# Patient Record
Sex: FEMALE | Race: WHITE | NOT HISPANIC OR LATINO | Employment: UNEMPLOYED | URBAN - METROPOLITAN AREA
[De-identification: names, ages, dates, MRNs, and addresses within clinical notes are randomized per-mention and may not be internally consistent; named-entity substitution may affect disease eponyms.]

---

## 2023-12-05 ENCOUNTER — TELEPHONE (OUTPATIENT)
Dept: GASTROENTEROLOGY | Facility: CLINIC | Age: 28
End: 2023-12-05

## 2023-12-05 ENCOUNTER — CONSULT (OUTPATIENT)
Dept: GASTROENTEROLOGY | Facility: CLINIC | Age: 28
End: 2023-12-05
Payer: COMMERCIAL

## 2023-12-05 VITALS
DIASTOLIC BLOOD PRESSURE: 64 MMHG | SYSTOLIC BLOOD PRESSURE: 98 MMHG | BODY MASS INDEX: 21.44 KG/M2 | WEIGHT: 133.4 LBS | HEIGHT: 66 IN

## 2023-12-05 DIAGNOSIS — K21.9 GASTROESOPHAGEAL REFLUX DISEASE, UNSPECIFIED WHETHER ESOPHAGITIS PRESENT: Primary | ICD-10-CM

## 2023-12-05 DIAGNOSIS — R19.4 CHANGE IN BOWEL HABITS: ICD-10-CM

## 2023-12-05 PROCEDURE — 99204 OFFICE O/P NEW MOD 45 MIN: CPT | Performed by: INTERNAL MEDICINE

## 2023-12-05 RX ORDER — DICYCLOMINE HCL 20 MG
TABLET ORAL 3 TIMES DAILY
COMMUNITY
Start: 2023-11-21 | End: 2023-12-05

## 2023-12-05 RX ORDER — LEVONORGESTREL AND ETHINYL ESTRADIOL 0.15-0.03
1 KIT ORAL DAILY
COMMUNITY
Start: 2023-11-07

## 2023-12-05 RX ORDER — POLYETHYLENE GLYCOL 3350 17 G/17G
17 POWDER, FOR SOLUTION ORAL DAILY
Qty: 255 G | Refills: 1 | Status: SHIPPED | OUTPATIENT
Start: 2023-12-05

## 2023-12-05 RX ORDER — FAMOTIDINE 40 MG/1
40 TABLET, FILM COATED ORAL DAILY
Qty: 90 TABLET | Refills: 0 | Status: SHIPPED | OUTPATIENT
Start: 2023-12-05

## 2023-12-05 NOTE — TELEPHONE ENCOUNTER
Scheduled date of colonoscopy/EGD(as of today):01/22/2024  Physician performing colonoscopy/EGD: Dr Zaira Lee  Location of colonoscopy/EGD: [facility] ([facility])  Bowel prep reviewed with patient: clenpiq  Instructions given to patient by: fariha  Clearances: none  : Tawanda Ferreira [phone]

## 2023-12-05 NOTE — PROGRESS NOTES
[facility] - Outpatient Consultation  Eliza Zimmer 33 y.o. female MRN: [id number]  Encounter: [id number]    ASSESSMENT AND PLAN:      1. Gastroesophageal reflux disease, unspecified whether esophagitis present  I do believe there is an element of reflux playing a role, as her symptoms worsen including her nausea when she eats fried fatty foods. I have given her famotidine as needed and we will proceed with upper endoscopy to rule out erosive disease, Matamoros's esophagus, peptic ulcer disease, and H. pylori. She has already had negative celiac serologies  - EGD; Future  - famotidine (PEPCID) 40 MG tablet; Take 1 tablet (40 mg total) by mouth daily  Dispense: 90 tablet; Refill: 0    2. Change in bowel habits  This change in bowel habits accompanied by her abdominal pain and macrocytosis with potential  vitamin B12 deficiency could be a constellation of symptoms chalked up to Crohn's disease. I will proceed with colonoscopy as well to rule out inflammatory bowel disease and any intraluminal etiologies explaining her change in bowel habits. Procedure risks and preparation discussed in detail. I will also check a TSH to ensure that her constipation is not from new onset hypothyroidism.  - Colonoscopy; Future  - sodium picosulfate, magnesium oxide, citric acid (Clenpiq) oral solution; Take 175 mL (1 bottle) the evening before the colonoscopy, between 5 PM and 9 PM, followed by a second 175 mL bottle 5 hours before the colonoscopy. Dispense: 350 mL; Refill: 0  - polyethylene glycol (GLYCOLAX) 17 GM/SCOOP powder; Take 17 g by mouth daily  Dispense: 255 g; Refill: 1  - TSH w/Reflex; Future  - Vitamin B12; Future  - TSH w/Reflex  - Vitamin B12      Follow up Appointment:  For upper endoscopy and colonoscopy    _______________________      Chief Complaint   Patient presents with    Abdominal Pain     epigastric    Diarrhea    Nausea     Get shakey    Bloated       HPI:   Jeremiah Goldberg Shyanne Sierra is a 33y.o. year old female with a PMH significant for 3 pregnancies who presents from a consultation from PCP for recent GI issues including nausea, abdominal pain, and alternating constipation with diarrhea. She states that for about a couple years she has been experiencing severe nausea and epigastric abdominal pain that is usually associated with the following bowel movement that is either loose and small or normal bowel movement at all. She does not see blood or mucus in the stool. She does find that her symptoms are typically flared by fried fatty foods. For example she was at the [employer] and had buffalo shrimp and had excruciating epigastric pain and small bowel movements. The bowel movements did not alleviate the pain, and it subsided hours later. She does admit to rare heartburn but no dysphagia or odynophagia. She is not aware of any family history of inflammatory bowel disease or colon cancer. She had a celiac serologies checked by her PCP that were negative. She also had a CAT scan that was reportedly normal she does admit to not feeling fully evacuated when she has a bowel movement    Historical Information   History reviewed. No pertinent past medical history.   Past Surgical History:   Procedure Laterality Date    WISDOM TOOTH EXTRACTION       Social History     Substance and Sexual Activity   Alcohol Use Yes    Alcohol/week: 3.0 standard drinks of alcohol    Types: 1 Glasses of wine, 2 Cans of beer per week     Social History     Substance and Sexual Activity   Drug Use Never     Social History     Tobacco Use   Smoking Status Never   Smokeless Tobacco Never     Family History   Problem Relation Age of Onset    Colon cancer Neg Hx     Colon polyps Neg Hx     Esophageal cancer Neg Hx     Stomach cancer Neg Hx        Meds/Allergies     Current Outpatient Medications:     famotidine (PEPCID) 40 MG tablet    levonorgestrel-ethinyl estradiol (SEASONALE) 0.15-0.03 MG per tablet    polyethylene glycol (GLYCOLAX) 17 GM/SCOOP powder    sodium picosulfate, magnesium oxide, citric acid (Clenpiq) oral solution    dicyclomine (BENTYL) 20 mg tablet    No Known Allergies    PHYSICAL EXAM:    Blood pressure 98/64, height 5' 6" (1.676 m), weight 60.5 kg (133 lb 6.4 oz). Body mass index is 21.53 kg/m². General Appearance: NAD, cooperative, alert  Eyes: Anicteric  GI:  Soft, non-tender, non-distended; normal bowel sounds; no masses, no organomegaly   Rectal: Deferred  Musculoskeletal: No edema. Skin:  No jaundice    Lab Results:   No results found for: "WBC", "HGB", "MCV", "PLT", "INR"  No results found for: "NA", "K", "CL", "CO2", "ANIONGAP", "BUN", "CREATININE", "GLUCOSE", "GLUF", "CALCIUM", "CORRECTEDCA", "AST", "ALT", "ALKPHOS", "PROT", "BILITOT", "EGFR"  No results found for: "IRON", "TIBC", "FERRITIN"  No results found for: "LIPASE"    Radiology Results:   No results found.

## 2023-12-11 ENCOUNTER — NURSE TRIAGE (OUTPATIENT)
Age: 28
End: 2023-12-11

## 2023-12-11 NOTE — TELEPHONE ENCOUNTER
Pt called to explain that she scheduled her US but then got a call back that her insurance requires her to go to [facility], however, there earliest availability is 1/3/23. She mentioned going to ED. She was looking for advice on what to do. I advised pt to either keep 1/3 appt with [facility] and call every couple days for cancellations, go to ED if pain is severe, or have US done at original location and pay out of pocket. Pt was provided with number for .

## 2023-12-11 NOTE — TELEPHONE ENCOUNTER
Last ov 12/5/23 Dr. Sharon Fajardo, scheduled for combo 1/22/24, Labs 11/2023,Imaging CT abd/pelvis MEDICAL [facility] 10/5/23. Patient sent a message also, complains of worsening pain under breastbone above umbilicus and radiating to her back right side. She does note abdominal pain worse after eating. [facility] and order will need to be faxed to patient registration for patient to complete. The fax #[phone] or she can print out from her [employer] if she accesses it. Please review and advise. Reason for Disposition   Information only question and nurse able to answer    Answer Assessment - Initial Assessment Questions  1. REASON FOR CALL or QUESTION: "What is your reason for calling today?" or "How can I best help you?" or "What question do you have that I can help answer?"      Patient requesting an order for abdominal ultrasound. Protocols used:  Information Only Call - No Triage-ADULT-[place]

## 2023-12-11 NOTE — TELEPHONE ENCOUNTER
Patient called and advised her that order was in however order is not signed/released. Please have doctor sign and return her call when she can see it in [facility].  [phone]

## 2023-12-11 NOTE — TELEPHONE ENCOUNTER
Regarding: worsening pain  ----- Message from Keiry Neil sent at 12/11/2023 10:34 AM EST -----  Pt was seen on 12/5/23. She is calling to say her pain is getting worse and was wondering if the Dr. Yamel Salguero order an ultrasound.  Please reach out to the pt

## 2023-12-12 ENCOUNTER — NURSE TRIAGE (OUTPATIENT)
Age: 28
End: 2023-12-12

## 2023-12-12 LAB
TSH SERPL DL<=0.005 MIU/L-ACNC: 2.25 UIU/ML (ref 0.45–4.5)
VIT B12 SERPL-MCNC: 360 PG/ML (ref 232–1245)

## 2023-12-12 NOTE — TELEPHONE ENCOUNTER
Patient called in stating she reported to [facility] ER yesterday, they performed ultrasound, no abnormal findings. (Will have staff send record release). Patient was ordered pantoprazole and sucralfate at discharge and she is requesting your input if she should take the medication for ongoing RUQ pain. Please advise. **Patient is also requesting that her procedures be scheduled earlier than 1/22/24. Please contact to update if any availability. Answer Assessment - Initial Assessment Questions  1. REASON FOR CALL or QUESTION: "What is your reason for calling today?" or "How can I best help you?" or "What question do you have that I can help answer?"      Patient wanted to inform provider of ER visit. Protocols used:  Information Only Call - No Triage-ADULT-[place]

## 2024-01-08 ENCOUNTER — ANESTHESIA (OUTPATIENT)
Dept: ANESTHESIOLOGY | Facility: AMBULATORY SURGERY CENTER | Age: 29
End: 2024-01-08

## 2024-01-08 ENCOUNTER — ANESTHESIA EVENT (OUTPATIENT)
Dept: ANESTHESIOLOGY | Facility: AMBULATORY SURGERY CENTER | Age: 29
End: 2024-01-08

## 2024-01-18 ENCOUNTER — TELEPHONE (OUTPATIENT)
Dept: GASTROENTEROLOGY | Facility: AMBULATORY SURGERY CENTER | Age: 29
End: 2024-01-18

## 2024-01-18 NOTE — PROGRESS NOTES
LM to call back to review instructions and confirm 1:30 PM arrival time for 1/22 EGD and Colonoscopy

## 2024-01-22 ENCOUNTER — HOSPITAL ENCOUNTER (OUTPATIENT)
Dept: GASTROENTEROLOGY | Facility: AMBULATORY SURGERY CENTER | Age: 29
Discharge: HOME | End: 2024-01-22
Attending: INTERNAL MEDICINE
Payer: COMMERCIAL

## 2024-01-22 ENCOUNTER — ANESTHESIA (OUTPATIENT)
Dept: GASTROENTEROLOGY | Facility: AMBULATORY SURGERY CENTER | Age: 29
End: 2024-01-22

## 2024-01-22 ENCOUNTER — ANESTHESIA EVENT (OUTPATIENT)
Dept: GASTROENTEROLOGY | Facility: AMBULATORY SURGERY CENTER | Age: 29
End: 2024-01-22

## 2024-01-22 VITALS
OXYGEN SATURATION: 100 % | RESPIRATION RATE: 22 BRPM | TEMPERATURE: 99.1 F | HEART RATE: 83 BPM | SYSTOLIC BLOOD PRESSURE: 112 MMHG | WEIGHT: 128 LBS | HEIGHT: 66 IN | BODY MASS INDEX: 20.57 KG/M2 | DIASTOLIC BLOOD PRESSURE: 64 MMHG

## 2024-01-22 DIAGNOSIS — R19.4 CHANGE IN BOWEL HABITS: ICD-10-CM

## 2024-01-22 DIAGNOSIS — K21.9 GASTROESOPHAGEAL REFLUX DISEASE, UNSPECIFIED WHETHER ESOPHAGITIS PRESENT: ICD-10-CM

## 2024-01-22 LAB
EXT PREGNANCY TEST URINE: NEGATIVE
EXT. CONTROL: NORMAL

## 2024-01-22 PROCEDURE — 88305 TISSUE EXAM BY PATHOLOGIST: CPT | Performed by: STUDENT IN AN ORGANIZED HEALTH CARE EDUCATION/TRAINING PROGRAM

## 2024-01-22 PROCEDURE — 45378 DIAGNOSTIC COLONOSCOPY: CPT | Performed by: INTERNAL MEDICINE

## 2024-01-22 PROCEDURE — 43239 EGD BIOPSY SINGLE/MULTIPLE: CPT | Performed by: INTERNAL MEDICINE

## 2024-01-22 RX ORDER — PROPOFOL 10 MG/ML
INJECTION, EMULSION INTRAVENOUS CONTINUOUS PRN
Status: DISCONTINUED | OUTPATIENT
Start: 2024-01-22 | End: 2024-01-22

## 2024-01-22 RX ORDER — PROPOFOL 10 MG/ML
INJECTION, EMULSION INTRAVENOUS AS NEEDED
Status: DISCONTINUED | OUTPATIENT
Start: 2024-01-22 | End: 2024-01-22

## 2024-01-22 RX ORDER — LIDOCAINE HYDROCHLORIDE 10 MG/ML
INJECTION, SOLUTION EPIDURAL; INFILTRATION; INTRACAUDAL; PERINEURAL AS NEEDED
Status: DISCONTINUED | OUTPATIENT
Start: 2024-01-22 | End: 2024-01-22

## 2024-01-22 RX ORDER — SODIUM CHLORIDE, SODIUM LACTATE, POTASSIUM CHLORIDE, CALCIUM CHLORIDE 600; 310; 30; 20 MG/100ML; MG/100ML; MG/100ML; MG/100ML
50 INJECTION, SOLUTION INTRAVENOUS CONTINUOUS
Status: DISCONTINUED | OUTPATIENT
Start: 2024-01-22 | End: 2024-01-22

## 2024-01-22 RX ADMIN — PROPOFOL 50 MG: 10 INJECTION, EMULSION INTRAVENOUS at 14:02

## 2024-01-22 RX ADMIN — PROPOFOL 50 MG: 10 INJECTION, EMULSION INTRAVENOUS at 14:06

## 2024-01-22 RX ADMIN — PROPOFOL 140 MCG/KG/MIN: 10 INJECTION, EMULSION INTRAVENOUS at 13:57

## 2024-01-22 RX ADMIN — PROPOFOL 50 MG: 10 INJECTION, EMULSION INTRAVENOUS at 14:04

## 2024-01-22 RX ADMIN — PROPOFOL 50 MG: 10 INJECTION, EMULSION INTRAVENOUS at 14:09

## 2024-01-22 RX ADMIN — PROPOFOL 50 MG: 10 INJECTION, EMULSION INTRAVENOUS at 14:12

## 2024-01-22 RX ADMIN — PROPOFOL 50 MG: 10 INJECTION, EMULSION INTRAVENOUS at 13:58

## 2024-01-22 RX ADMIN — PROPOFOL 50 MG: 10 INJECTION, EMULSION INTRAVENOUS at 14:00

## 2024-01-22 RX ADMIN — SODIUM CHLORIDE, SODIUM LACTATE, POTASSIUM CHLORIDE, CALCIUM CHLORIDE 50 ML/HR: 600; 310; 30; 20 INJECTION, SOLUTION INTRAVENOUS at 13:42

## 2024-01-22 RX ADMIN — LIDOCAINE HYDROCHLORIDE 100 MG: 10 INJECTION, SOLUTION EPIDURAL; INFILTRATION; INTRACAUDAL; PERINEURAL at 13:57

## 2024-01-22 RX ADMIN — PROPOFOL 100 MG: 10 INJECTION, EMULSION INTRAVENOUS at 13:57

## 2024-01-22 NOTE — ANESTHESIA POSTPROCEDURE EVALUATION
Post-Op Assessment Note    CV Status:  Stable  Pain Score: 0    Pain management: adequate       Mental Status:  Awake and sleepy   Hydration Status:  Euvolemic   PONV Controlled:  Controlled   Airway Patency:  Patent     Post Op Vitals Reviewed: Yes      Staff: CRNA               BP   90/54   Temp      Pulse   87   Resp   16   SpO2   98%

## 2024-01-22 NOTE — ANESTHESIA PREPROCEDURE EVALUATION
Procedure:  COLONOSCOPY  EGD    Relevant Problems   No relevant active problems        Physical Exam    Airway    Mallampati score: II         Dental       Cardiovascular      Pulmonary      Other Findings  post-pubertal.      Anesthesia Plan  ASA Score- 1     Anesthesia Type- IV sedation with anesthesia with ASA Monitors.         Additional Monitors:     Airway Plan:            Plan Factors-    Chart reviewed.    Patient summary reviewed.    Patient is not a current smoker. Patient not instructed to abstain from smoking on day of procedure. Patient did not smoke on day of surgery.            Induction- intravenous.    Postoperative Plan-     Informed Consent- Anesthetic plan and risks discussed with patient.  I personally reviewed this patient with the CRNA. Discussed and agreed on the Anesthesia Plan with the CRNA..

## 2024-01-22 NOTE — H&P
"History and Physical -  [facility]  Eliseo Noriega 28 y.o. female MRN: [id number]    HPI: Eliseo Noriega is a 28 y.o. female who presents for upper endoscopy and colonoscopy due to epigastric pain and change in bowel    REVIEW OF SYSTEMS: Per the HPI, and otherwise unremarkable.    Historical Information   Past Medical History:   Diagnosis Date    Patient denies medical problems      Past Surgical History:   Procedure Laterality Date    WISDOM TOOTH EXTRACTION       Social History   Social History     Substance and Sexual Activity   Alcohol Use Yes    Alcohol/week: 3.0 standard drinks of alcohol    Types: 1 Glasses of wine, 2 Cans of beer per week    Comment: social     Social History     Substance and Sexual Activity   Drug Use Never     Social History     Tobacco Use   Smoking Status Never   Smokeless Tobacco Never     Family History   Problem Relation Age of Onset    Colon cancer Neg Hx     Colon polyps Neg Hx     Esophageal cancer Neg Hx     Stomach cancer Neg Hx        Meds/Allergies       Current Outpatient Medications:     levonorgestrel-ethinyl estradiol (SEASONALE) 0.15-0.03 MG per tablet    dicyclomine (BENTYL) 20 mg tablet    famotidine (PEPCID) 40 MG tablet    polyethylene glycol (GLYCOLAX) 17 GM/SCOOP powder    sodium picosulfate, magnesium oxide, citric acid (Clenpiq) oral solution    Current Facility-Administered Medications:     lactated ringers infusion, 50 mL/hr, Intravenous, Continuous, Continue from Pre-op at 01/22/24 1348    No Known Allergies    Objective     /70   Pulse (!) 113   Temp 99.1 °F (37.3 °C) (Temporal)   Resp 17   Ht 5' 6\" (1.676 m)   Wt 58.1 kg (128 lb)   LMP 01/11/2024 (Exact Date)   SpO2 100%   BMI 20.66 kg/m²     PHYSICAL EXAM    Gen: NAD AAOx3  Head: Normocephalic, Atraumatic  CV: S1S2 RRR no m/r/g  CHEST: Clear b/l no c/r/w  ABD: soft, +BS NT/ND  EXT: no edema    ASSESSMENT/PLAN:  This is a 28 y.o. year old female here for upper endoscopy and " colonoscopy, and she is stable and optimized for her procedure.

## 2024-01-24 PROCEDURE — 88305 TISSUE EXAM BY PATHOLOGIST: CPT | Performed by: STUDENT IN AN ORGANIZED HEALTH CARE EDUCATION/TRAINING PROGRAM

## 2024-12-09 ENCOUNTER — OFFICE VISIT (OUTPATIENT)
Dept: OBGYN CLINIC | Facility: CLINIC | Age: 29
End: 2024-12-09
Payer: COMMERCIAL

## 2024-12-09 VITALS
BODY MASS INDEX: 21.21 KG/M2 | DIASTOLIC BLOOD PRESSURE: 70 MMHG | WEIGHT: 132 LBS | SYSTOLIC BLOOD PRESSURE: 100 MMHG | HEIGHT: 66 IN

## 2024-12-09 DIAGNOSIS — Z01.419 WOMEN'S ANNUAL ROUTINE GYNECOLOGICAL EXAMINATION: Primary | ICD-10-CM

## 2024-12-09 PROCEDURE — S0610 ANNUAL GYNECOLOGICAL EXAMINA: HCPCS | Performed by: OBSTETRICS & GYNECOLOGY

## 2024-12-09 NOTE — PROGRESS NOTES
[facility] - [address], [address], [place] [address]    ASSESSMENT/PLAN: Eliseo Noriega is a 29 y.o.  who presents for annual gynecologic exam.    Encounter for routine gynecologic examination  - Routine well woman exam completed today.  - Cervical Cancer Screening: Current ASCCP Guidelines reviewed. Last Pap: Not on file . History of abnormal: 2 years ago  - HPV Vaccination status: Immunization series complete  - STI screening offered including HIV testing: offered, pt declined  - Contraceptive counseling discussed.  Current contraception: no method, pt tried OCPs but caused abdominal pain:   - The following were reviewed in today's visit: breast self exam, adequate intake of calcium and vitamin D, exercise, and healthy diet    Additional problems addressed during this visit:  1. Women's annual routine gynecological examination  -     IGP, Aptima HPV, Rfx 16/18,45      CC:  Annual Gynecologic Examination    HPI: Eliseo Noriega is a 29 y.o.  who presents for annual gynecologic examination. She reports a monthly menses. She reports having pelvic pain during intercourse only when she is ovulating. So if she avoids intercourse during that time, her pelvic pain is manageable. She had been started on OCPs to help with this however caused her abdominal pain. She is undecided on future pregnancy.     ROS: Negative except as noted in HPI    Patient's last menstrual period was 2024.       She  reports being sexually active and has had partner(s) who are male. She reports using the following method of birth control/protection: Condom Male.       The following portions of the patient's history were reviewed and updated as appropriate:   Past Medical History:   Diagnosis Date    Endometriosis     Patient denies medical problems      Past Surgical History:   Procedure Laterality Date    WISDOM TOOTH EXTRACTION       Family History   Problem Relation Age of Onset    Colon cancer Neg Hx   "   Colon polyps Neg Hx     Esophageal cancer Neg Hx     Stomach cancer Neg Hx      Social History     Socioeconomic History    Marital status: /Civil Union     Spouse name: None    Number of children: None    Years of education: None    Highest education level: None   Occupational History    None   Tobacco Use    Smoking status: Never    Smokeless tobacco: Never   Vaping Use    Vaping status: Never Used   Substance and Sexual Activity    Alcohol use: Yes     Alcohol/week: 3.0 standard drinks of alcohol     Types: 1 Glasses of wine, 2 Cans of beer per week     Comment: social    Drug use: Never    Sexual activity: Yes     Partners: Male     Birth control/protection: Condom Male     Comment: Birth congrol, just started it   Other Topics Concern    None   Social History Narrative    None     Social Drivers of Health     Financial Resource Strain: Not on file   Food Insecurity: Not on file   Transportation Needs: Not on file   Physical Activity: Not on file   Stress: Not on file   Social Connections: Not on file   Intimate Partner Violence: Not on file   Housing Stability: Not on file     No outpatient medications have been marked as taking for the 12/9/24 encounter (Office Visit) with Teresa CHENG DO.     No Known Allergies        Objective:  /70 (BP Location: Left arm, Patient Position: Sitting, Cuff Size: Standard)   Ht 5' 6\" (1.676 m)   Wt 59.9 kg (132 lb)   LMP 11/21/2024   BMI 21.31 kg/m²        Chaperone present? Pt declined    General Appearance: alert and oriented, in no acute distress.   Respiratory: Unlabored breathing.  Abdomen: Soft, non-tender, non-distended, no masses, no rebound or guarding.  Breast Exam: No dimpling, nipple retraction or discharge. No lumps or masses. No axillary or supraclavicular nodes.   Pelvic:   External genitalia: Normal appearance, no abnormal pigmentation, no lesions or masses. Normal Bartholin's and Hesperia's.      Urinary system: Urethral meatus normal,     "   Bladder non-tender.      Vaginal: normal mucosa without prolapse or lesions. Normal-appearing physiologic discharge.      Cervix: Normal-appearing, well-epithelialized, no gross lesions or masses. No cervical motion tenderness.      Adnexa: No adnexal masses or tenderness noted.      Uterus: Normal-sized, regular contour, midline, mobile, no uterine tenderness.  Extremities: Normal range of motion. Warm, well-perfused, non-tender.   Skin: normal, no rash or abnormalities  Neurologic: alert, oriented x3  Psychiatric: Appropriate affect, mood stable, cooperative with exam.        Teresa Wilkinson DO  12/9/2024 4:55 PM

## 2024-12-13 LAB
CYTOLOGIST CVX/VAG CYTO: NORMAL
DX ICD CODE: NORMAL
HPV GENOTYPE REFLEX: NORMAL
HPV I/H RISK 4 DNA CVX QL PROBE+SIG AMP: NEGATIVE
OTHER STN SPEC: NORMAL
PATH REPORT.FINAL DX SPEC: NORMAL
SL AMB NOTE:: NORMAL
SL AMB SPECIMEN ADEQUACY: NORMAL
SL AMB TEST METHODOLOGY: NORMAL

## 2024-12-16 ENCOUNTER — RESULTS FOLLOW-UP (OUTPATIENT)
Dept: OBGYN CLINIC | Facility: CLINIC | Age: 29
End: 2024-12-16

## 2025-01-31 ENCOUNTER — NURSE TRIAGE (OUTPATIENT)
Age: 30
End: 2025-01-31

## 2025-01-31 ENCOUNTER — OFFICE VISIT (OUTPATIENT)
Dept: OBGYN CLINIC | Facility: CLINIC | Age: 30
End: 2025-01-31
Payer: COMMERCIAL

## 2025-01-31 VITALS
DIASTOLIC BLOOD PRESSURE: 80 MMHG | HEIGHT: 66 IN | SYSTOLIC BLOOD PRESSURE: 130 MMHG | WEIGHT: 130 LBS | BODY MASS INDEX: 20.89 KG/M2

## 2025-01-31 DIAGNOSIS — O36.80X0 PREGNANCY OF UNKNOWN ANATOMIC LOCATION: Primary | ICD-10-CM

## 2025-01-31 PROCEDURE — 99214 OFFICE O/P EST MOD 30 MIN: CPT | Performed by: OBSTETRICS & GYNECOLOGY

## 2025-01-31 NOTE — PROGRESS NOTES
[facility] [address], [address], [place] [address]    Assessment/Plan:  Assessment & Plan  Pregnancy of unknown anatomic location  I recommend she be seen in ER for hcg and formal ultrasound to rule out ectopic. I discussed concern as even with 5 weeks I would be able to see small gestational sac. Recommended to go to [facility] ER. Clinical POD notified to notify ER.         Subjective:   Eliseo Noriega is a 29 y.o.  .  CC:   Chief Complaint   Patient presents with    Pregnancy Ultrasound     5 weeks 3 days, bad abdominal cramping       HPI: 29y  @ 5.3wks by LMP presents with complaints of pelvic cramping that woke her up from her sleep. She reports the pain was much worse than she has experienced in the past. She denies any bleeding. She reports the pain was continuous but now it is coming and going but still intense. Pelvic u/s performed in the office which showed empty uterus.     ROS: Negative except as noted in HPI    Patient's last menstrual period was 2024.       She  reports being sexually active and has had partner(s) who are male. She reports using the following method of birth control/protection: Condom Male.       The following portions of the patient's history were reviewed and updated as appropriate:   Past Medical History:   Diagnosis Date    Endometriosis     Patient denies medical problems      Past Surgical History:   Procedure Laterality Date    WISDOM TOOTH EXTRACTION       Family History   Problem Relation Age of Onset    Colon cancer Neg Hx     Colon polyps Neg Hx     Esophageal cancer Neg Hx     Stomach cancer Neg Hx      Social History     Socioeconomic History    Marital status: /Civil Union     Spouse name: Not on file    Number of children: Not on file    Years of education: Not on file    Highest education level: Not on file   Occupational History    Not on file   Tobacco Use    Smoking status: Never    Smokeless tobacco: Never   Vaping Use     "Vaping status: Never Used   Substance and Sexual Activity    Alcohol use: Yes     Alcohol/week: 3.0 standard drinks of alcohol     Types: 1 Glasses of wine, 2 Cans of beer per week     Comment: social    Drug use: Never    Sexual activity: Yes     Partners: Male     Birth control/protection: Condom Male     Comment: Birth congrol, just started it   Other Topics Concern    Not on file   Social History Narrative    Not on file     Social Drivers of Health     Financial Resource Strain: Not on file   Food Insecurity: Not on file   Transportation Needs: Not on file   Physical Activity: Not on file   Stress: Not on file   Social Connections: Not on file   Intimate Partner Violence: Not on file   Housing Stability: Not on file     No outpatient medications have been marked as taking for the 1/31/25 encounter (Office Visit) with Teresa CHENG DO.     No Known Allergies        Objective:  /80 (BP Location: Right arm, Patient Position: Sitting, Cuff Size: Standard)   Ht 5' 6\" (1.676 m)   Wt 59 kg (130 lb)   LMP 12/24/2024   BMI 20.98 kg/m²          General Appearance: alert and oriented, in no acute distress.   Abdomen: Soft, non-tender, non-distended, no masses, no rebound or guarding.  Extremities: Normal range of motion.   Skin: normal, no rash or abnormalities  Neurologic: alert, oriented x3  Psychiatric: Appropriate affect, mood stable, cooperative with exam.        Teresa Wilkinson DO  1/31/2025 11:29 AM   "

## 2025-01-31 NOTE — TELEPHONE ENCOUNTER
"This patient is 5 weeks 3 days according to LMP of 12/24/24. She is c/o lower abd cramping for 4 days that started as intermittent but has been constant for that last 5 hours and lower back pain. Rates pain a 4-5. She also states she has had an increase in clear / white vaginal discharge this week. Patient denies diarrhea, fever, urination pain, vaginal bleeding, vomiting or any other symptoms at this time. Advised she monitor for increased abdominal pain and back pain and if ot becomes worse or more severe to call back, also call if vaginal bleeding occurs, lightheaded , dizzy, or any other new symptoms. ESC message to Dr. Dueñas.   Response from Dr. Dueñas: Patient can be seen in office today for ultrasound and abdominal exam to assess if ED evaluation is needed.     Called patient and provided advice per Dr. Dueñas. Appointment scheduled for today at 11:30. Patient verbalized understanding.           Reason for Disposition   MODERATE-SEVERE abdominal pain    Answer Assessment - Initial Assessment Questions  1. LOCATION: \"Where does it hurt?\"       Lower abdomen - top of pelvis   2. RADIATION: \"Does the pain shoot anywhere else?\" (e.g., chest, back, shoulder)      No   3. ONSET: \"When did the pain begin?\" (e.g., minutes, hours or days ago)       4 days ago   4. ONSET: \"Gradual or sudden onset?\"      Gradual   5. PATTERN \"Does the pain come and go, or has it been constant since it started?\"       Constant since 3 am   6. SEVERITY: \"How bad is the pain?\" \"What does it keep you from doing?\"  (e.g., Scale 1-10; mild, moderate, or severe)      4-5  7. RECURRENT SYMPTOM: \"Have you ever had this type of stomach pain before?\" If Yes, ask: \"When was the last time?\" and \"What happened that time?\"       No   8. CAUSE: \"What do you think is causing the stomach pain?\"      Unknown   9. RELIEVING/AGGRAVATING FACTORS: \"What makes it better or worse?\" (e.g., antacids, bowel movement, movement)      NO   10. OTHER SYMPTOMS: \"Do you " "have any other symptoms?\" (e.g., back pain, diarrhea, fever, urination pain, vaginal bleeding, vaginal discharge, vomiting)        Lower Back pain - 4-5 (constant).  Increased clear white discharge.    11. CASEY: \"What date are you expecting to deliver?\"        LMP: 12/24/24    Protocols used: Pregnancy - Abdominal Pain Less Than 20 Weeks EGA-Adult-[place]    "

## 2025-02-28 ENCOUNTER — ULTRASOUND (OUTPATIENT)
Dept: OBGYN CLINIC | Facility: CLINIC | Age: 30
End: 2025-02-28
Payer: COMMERCIAL

## 2025-02-28 VITALS
SYSTOLIC BLOOD PRESSURE: 120 MMHG | BODY MASS INDEX: 21.69 KG/M2 | HEIGHT: 66 IN | DIASTOLIC BLOOD PRESSURE: 80 MMHG | WEIGHT: 135 LBS

## 2025-02-28 DIAGNOSIS — Z32.01 POSITIVE PREGNANCY TEST: Primary | ICD-10-CM

## 2025-02-28 DIAGNOSIS — Z3A.01 7 WEEKS GESTATION OF PREGNANCY: ICD-10-CM

## 2025-02-28 PROCEDURE — 99213 OFFICE O/P EST LOW 20 MIN: CPT | Performed by: OBSTETRICS & GYNECOLOGY

## 2025-02-28 PROCEDURE — 76817 TRANSVAGINAL US OBSTETRIC: CPT | Performed by: OBSTETRICS & GYNECOLOGY

## 2025-02-28 NOTE — PROGRESS NOTES
"Pregnancy Confirmation Visit  [facility] - [address], [place] [address]    Assessment/Plan:  29 y.o.  presenting with missed menses.  Viable pregnancy 7w6d by ultrasound today. (On ultrasound machine was measuring 8 weeks but 7w6d with epic calculations)  - Continue/start prenatal vitamin  - We reviewed her current medications and discussed which are safe to continue in pregnancy  - We briefly discussed options for aneuploidy screening, to be discussed further at the prenatal intake  - Schedule prenatal intake with RN and initial prenatal visit; prenatal labs will be ordered during the prenatal intake      Subjective:    CC: Missed period    Eliseo Noriega is a 29 y.o.  who presents with missed menses.  Patient's last menstrual period was 2024.    Patient notes that this pregnancy was planned and desired.  She was not using contraception at the time of conception. She reports she is certain of her LMP and that she has regular menses. She has has no vaginal bleeding since her LMP.    Objective:  /80 (BP Location: Right arm, Patient Position: Sitting, Cuff Size: Standard)   Ht 5' 6\" (1.676 m)   Wt 61.2 kg (135 lb)   LMP 2024   BMI 21.79 kg/m²     Physical Exam:  General: Well appearing, no distress  CV: Regular rate  Respiratory: Unlabored breathing  Abdomen: Soft, nontender  Extremities: Without edema  Mood and Affect: Appropriate    Transvaginal Pelvic Ultrasound  [place] IUP  Yolk sac: Present  Fetal Pole: Present  Cardiac activity: Present   bpm  No adnexal masses appreciated    "

## 2025-03-19 NOTE — PROGRESS NOTES
OB INTAKE INTERVIEW  Patient is 29 y.o. who presents for OB intake at 10.6 wks  She is accompanied by herself during this encounter  The father of her baby (Babatunde) is involved in the pregnancy and is 37 years old.      Last Menstrual Period: 24 (exact)  Reports cycles over the past year have been irregular, they have been off by a few days to a week late. Some cycles were 23 days apart to 31 days apart, between 2024 to 2024  Ultrasound: Measured 7 weeks 6 days on 25  Estimated Date of Delivery: 10/11/25-dating based on Ultrasound 25    Signs/Symptoms of Pregnancy  Current pregnancy symptoms:   Nausea no vomiting-  Recommended to try to eat 5-6 small meals a day, increase protein with meals/snacks, in order to keep stomach full at all times. Try bland foods like plain crackers, toast as well as carbonated drinks like gingerale. Hard peppermint or ginger candy, is a natural treatment for nausea,  B- pops  with B6 ( available at the pharmacy), B6 25 mg in the AM and 25 mg in PM. May combine with Unisom 12.5 mg at night. Unisom may cause drowsiness.  High complex carbohydrate snack  before bedtime. If symptoms worsen, unable to keep fluids down without vomiting for more than 12 hours, contact the office.    Constipation YES- using Metamucil as needed. Encouraged to increase fiber and fluids (at least 8-10 cups daily), well balanced diet to include (fruits, vegetables, whole grain breads), 30 minute walk daily and directed to safe OTC medication list. Recommend Colace (mild stool softener).  Headaches YES, (early in pregnancy) does not like to take medication with pregnancy   Cramping/spotting YES, occasional cramping not spotting   PICA cravings no    Diabetes-  There is no height or weight on file to calculate BMI.  If patient has 1 or more, please order early 1 hour GTT  History of GDM no  BMI >35 no  History of PCOS or current metformin use no  History of LGA/macrosomic  infant (4000g/9lbs) no    If patient has 2 or more, please order early 1 hour GTT  BMI>30 no  AMA no  First degree relative with type 2 diabetes no  History of chronic HTN, hyperlipidemia, elevated A1C no  High risk race (, , ,  or ) no    Hypertension- if you answer yes to any of the following, please order baseline preeclampsia labs (cbc, comprehensive metabolic panel, urine protein creatinine ratio, uric acid)  History of of chronic HTN no  History of gestational HTN no  History of preeclampsia, eclampsia, or HELLP syndrome no  History of diabetes no  History of lupus,sjogrens syndrome, kidney disease no    Thyroid- if yes order TSH with reflex T4  History of thyroid disease no    Bleeding Disorder or Hx of DVT-patient or first degree relative with history of. Order the following if not done previously.   (Factor V, antithrombin III, prothrombin gene mutation, protein C and S Ag, lupus anticoagulant, anticardiolipin, beta-2 glycoprotein)   no    OB/GYN-  History of abnormal pap smear YES, reports she had an abnormal pap smear 2 yrs and reverted back to normal.      Prior GYN records were not available at time  of Intake.   Date of last pap smear 24-NILM  History of HPV Denies but no records were available at Intake   History of Herpes/HSV no  History of other STI (gonorrhea, chlamydia, trich) no  History of prior  YES, , , -Pt reports all her pregnancies were induced   History of prior  no  History of  delivery prior to 36 weeks 6 days no  History of blood transfusion no  Ok for blood transfusion  yes    Substance screening-   History of tobacco use no  Currently using tobacco no  Substance Use Screen Level No Risk     MRSA Screening-   Does the pt have a hx of MRSA? no    Immunizations:  Influenza vaccine given this season No   Discussed Tdap vaccine yes  Discussed COVID Vaccine yes  History of Varicella or Vaccination  "Yes, had Vaccination 10/14/96 and 11/17/2008     Genetic/MFM-  Do you or your partner have a history of any of the following in yourselves or first degree relatives?  Cystic fibrosis YES, Per patient she \"thinks\" she was a carrier for the CF gene but her husbands testing came back negative.   Spinal muscular atrophy no  Hemoglobinopathy/Sickle Cell/Thalassemia no  Fragile X Intellectual Disability no    If yes, discuss Carrier Screening and recommend consultation with MFM/Genetic Counseling and place specific [facility] Referral for.    If no, discuss Carrier Screening being completed once in a lifetime as a standard of care lab test. Place orders for Cystic Fibrosis Gene Test (AXY816) and Spinal Muscular Atrophy DNA ([id number])       Appointment for Nuchal Translucency Ultrasound at [facility] scheduled for 04/04/25    Suspected CF carrier-Per patient she \"thinks\" she was a carrier for the CF gene but her husbands testing was negative. No records were available at the time of Intake. Declines to be retested, will try to obtain a copy of results for the chart.Record release obtained.  Interview education  [facility] Pregnancy Essentials Book reviewed, discussed and attached to their AVS yes    Nurse/Family Partnership- patient may qualify No  Ambulatory Referral to  placed-N/A  EPDS: 2    Prenatal lab work scripts YES  Preferred Lab: [facility]   Extra labs ordered  No     Aspirin/Preeclampsia Screen  Risk Level Risk Factor Recommendation   LOW Prior Uncomplicated full-term delivery YES No Aspirin recommendation        MODERATE Nulliparity no Recommend low-dose aspirin if     BMI>30 no 2 or more moderate risk factors    Family History Preeclampsia (mother/sister) no     35yr old or greater no     Black Race, Concern for SDOH/Low Socioeconomic no     IVF Pregnancy  no     Personal History Risks (low birth weight, prior adverse preg outcome, >10yr preg interval) no         HIGH History of Preeclampsia no Recommend " "low-dose aspirin if     Multifetal gestation no 1 or more high risk factors    Chronic HTN no     Type 1 or 2 Diabetes no     Renal Disease no     Autoimmune Disease  no          Contraindications to ASA therapy:  NSAID/ ASA allergy: no  Asthma with history of ASA induced bronchospasm: no  Relative contraindications:  History of GI bleed: no  Active peptic ulcer disease: no  Severe hepatic dysfunction: no    Patient should be recommended to take ASA 162mg during this pregnancy from 12-36wks to lower her risk of preeclampsia:   Low Risk       The patient has a history now or in prior pregnancy notable for:  See Below       Details that I feel the provider should be aware of:   Eliseo is a current patient of the practice. This is her fourth pregnancy. This is a planned and welcomed pregnancy.   Her pregnancy history includes 3 full term uncomplicated vaginal deliveries in 2019, 2021 and 2023 at [facility].  Pt reports she was induced with her pregnancy in 2019 for concerns of IUGR.     Medical History includes:  Suspected Endometriosis -pain durig ovulation, pain with intercourse and heavy mense placed on short term course of birth control.   Suspected CF carrier-Per patient she \"thinks\" she was a carrier for the CF gene but her husbands testing was negative. No records were available at the time of Intake. Declines to be retested, will try to obtain a copy of results for the chart.  Record release obtained.   IBS-currently not on any medical management, avoids food triggers      PN1 visit scheduled. The patient was oriented to our practice, the navigator role, reviewed delivering physicians and [facility] for Delivery. All questions were answered.    Interviewed by: KI Cano RN   "

## 2025-03-19 NOTE — PATIENT INSTRUCTIONS
Anupama Noriega,     It was so nice getting to know you today. Remember if you have an urgent or time sensitive concern, please call the practice phone number so a clinical triage team member can review your symptoms and get in touch with our on call provider if necessary. If you have general questions or need help navigating our services please REPLY to this message so it comes directly to me and I will respond between other patients. If I am out of the office for any reason, another nurse navigator may reach out to help you. Our Pregnancy Essential Guide is a great online resource--please use the link below.     [facility] Pregnancy Essentials Guide  . [facility] ([email or web address])     Congratulations on your pregnancy!  We thank you for allowing us to participate in your care.    NEXT STEPS    Go to the lab to have your prenatal bloodwork competed if you have not already done so.  There is a listing of [facility] Laboratories and locations in your prenatal folder. You may also visit [email or web address] or call [phone].   Please be aware that some insurance companies may require you to go to a specific lab ([facility] or [facility]). You can verify this by contacting your insurance company.   If you have decided to be screened for CF and SMA genetic testing, these tests require prior authorization and scheduling.  Prior Authorization is not a guarantee of payment. There may be out of pocket expenses that includes copays, deductibles and or coinsurance for your individual plan.  Please call [phone] if our team has not contacted you in 7 business days.  Please have your blood work completed prior to you next prenatal visit.    If you have decided to have genetic testing done at Maternal Fetal Medicine, that will be scheduled by [facility]. You may have already scheduled this appointment.  If not, please call their office to schedule this appointment.  Based on the referral placed by our office, they will know  how to schedule you appropriately.    Contact information for Maternal Fetal Medicine is located in your prenatal folder. The main phone number to their office is [phone].     Return to our office for your first routine prenatal visit.     Warning Signs During Pregnancy - If you experience any problems or concerns, call the office directly.  The list below includes warning signs your providers would like you to be aware of.  If you experience any of these at any time during your pregnancy, please call us as soon as possible.    Vaginal bleeding   Sharp abdominal pain that does not go away   Fever (more than 100.4?F and is not relieved with Tylenol)   Persistent vomiting lasting greater than 24 hours   Chest pain/Shortness of breath   Pain or burning when you urinate     Call the OFFICE [phone] for any questions/emergencies.  At night or on the weekend, calls go through a triage service, please indicate it is an emergency and the DOCTOR on call will be paged.    Remember to only use [employer] for non-urgent concerns or questions.    Our doctors deliver at [facility] in [place]. The address is provided below.     [facility]  [address] [place]  [address]     Please click on the links below to review our Pregnancy Essential Guide.    [facility] Pregnancy Essentials Guide  [facility] ([email or web address])     [facility] - Virtual Tour ([employer])      To learn more about the Prenatal Education classes that [facility] offers, click the link below.  Prenatal Education Classes    Click on the link below to review [facility] Lab locations.  [facility] Lab Locations    NewsCred resource  REPP is a tool to connect you to community resources you may need.      Thank you,   Sandro PHILIP, RN  OB Nurse Navigator

## 2025-03-21 ENCOUNTER — INITIAL PRENATAL (OUTPATIENT)
Dept: OBGYN CLINIC | Facility: CLINIC | Age: 30
End: 2025-03-21
Payer: COMMERCIAL

## 2025-03-21 VITALS
SYSTOLIC BLOOD PRESSURE: 108 MMHG | WEIGHT: 139 LBS | BODY MASS INDEX: 22.34 KG/M2 | HEIGHT: 66 IN | DIASTOLIC BLOOD PRESSURE: 58 MMHG

## 2025-03-21 VITALS
HEIGHT: 66 IN | SYSTOLIC BLOOD PRESSURE: 108 MMHG | DIASTOLIC BLOOD PRESSURE: 58 MMHG | BODY MASS INDEX: 22.34 KG/M2 | WEIGHT: 139 LBS

## 2025-03-21 DIAGNOSIS — Z11.3 ROUTINE SCREENING FOR STI (SEXUALLY TRANSMITTED INFECTION): ICD-10-CM

## 2025-03-21 DIAGNOSIS — Z87.59 HISTORY OF PRIOR PREGNANCY WITH IUGR NEWBORN: ICD-10-CM

## 2025-03-21 DIAGNOSIS — O09.899 CYSTIC FIBROSIS CARRIER, ANTEPARTUM: Primary | ICD-10-CM

## 2025-03-21 DIAGNOSIS — Z14.1 CYSTIC FIBROSIS CARRIER, ANTEPARTUM: Primary | ICD-10-CM

## 2025-03-21 DIAGNOSIS — Z3A.11 11 WEEKS GESTATION OF PREGNANCY: ICD-10-CM

## 2025-03-21 DIAGNOSIS — Z36.89 ENCOUNTER FOR OTHER SPECIFIED ANTENATAL SCREENING: Primary | ICD-10-CM

## 2025-03-21 LAB
EXTERNAL CHLAMYDIA SCREEN: NORMAL
EXTERNAL GONORRHEA SCREEN: NORMAL
SL AMB  POCT GLUCOSE, UA: NORMAL
SL AMB POCT URINE PROTEIN: NORMAL

## 2025-03-21 PROCEDURE — OBC

## 2025-03-21 PROCEDURE — PNV: Performed by: PHYSICIAN ASSISTANT

## 2025-03-21 PROCEDURE — 81002 URINALYSIS NONAUTO W/O SCOPE: CPT | Performed by: PHYSICIAN ASSISTANT

## 2025-03-21 NOTE — ASSESSMENT & PLAN NOTE
Patient reports she tested positive as being a carrier in the past.  was tested and negative. Will request records.

## 2025-03-21 NOTE — ASSESSMENT & PLAN NOTE
Recent annual done 12/2024, breast exam deferred. Pap up to date.   STD cultures done today.   Has MFM ultrasound scheduled.   Script for initial prenatal labs was given at nurse intake today.   Continue routine prenatal care.   Return to office for ob check in 4 weeks.

## 2025-03-21 NOTE — PROGRESS NOTES
"Routine Prenatal Visit  [facility] - [address], [address], [place] [address]    Assessment/Plan:  Eliseo is a 29 y.o. year old  at 10w6d who presents for routine prenatal visit.     1. Cystic fibrosis carrier, antepartum  Assessment & Plan:  Patient reports she tested positive as being a carrier in the past.  was tested and negative. Will request records.   2. 11 weeks gestation of pregnancy  Assessment & Plan:  Recent annual done 2024, breast exam deferred. Pap up to date.   STD cultures done today.   Has MFM ultrasound scheduled.   Script for initial prenatal labs was given at nurse intake today.   Continue routine prenatal care.   Return to office for ob check in 4 weeks.     Orders:  -     POCT urine dip  3. Routine screening for STI (sexually transmitted infection)  -     Chlamydia/GC KHRIS, Confirmation  4. History of prior pregnancy with IUGR       Next OB Visit 4 weeks.    Subjective:     CC: Prenatal care    Eliseo Noriega is a 29 y.o.  female who presents for routine prenatal care at 10w6d.  Pregnancy ROS: Nausea, no vomiting. No FM, HA, cramping, VB, LOF, edema, domestic violence, or smoking. Tolerating PNV.        The following portions of the patient's history were reviewed and updated as appropriate: allergies, current medications, past family history, past medical history, obstetric history, gynecologic history, past social history, past surgical history and problem list.      Objective:  /58 (BP Location: Left arm, Patient Position: Sitting, Cuff Size: Standard)   Ht 5' 6\" (1.676 m)   Wt 63 kg (139 lb)   LMP 2024 (Exact Date)   BMI 22.44 kg/m²   Pregravid Weight/BMI: 59 kg (130 lb) (BMI 20.99)  Current Weight: 63 kg (139 lb)   Total Weight Gain: 4.082 kg (9 lb)   Pre- Vitals      Flowsheet Row Most Recent Value   Prenatal Assessment    Fetal Heart Rate 154   Fundal Height (cm) 11 cm   Movement Absent   Prenatal Vitals    Blood " Pressure 108/58   Weight - Scale 63 kg (139 lb)   Urine Albumin/Glucose    Dilation/Effacement/Station    Vaginal Drainage    Edema    LLE Edema None   RLE Edema None   Facial Edema None             Physical Exam  Constitutional:       Appearance: Normal appearance. She is well-developed.   Neck:      Thyroid: No thyroid mass or thyromegaly.   Cardiovascular:      Rate and Rhythm: Normal rate and regular rhythm.      Heart sounds: Normal heart sounds. No murmur heard.     No friction rub. No gallop.   Pulmonary:      Effort: Pulmonary effort is normal. No respiratory distress.      Breath sounds: Normal breath sounds. No wheezing or rales.   Abdominal:      General: Bowel sounds are normal. There is no distension.      Palpations: Abdomen is soft. There is no mass.      Tenderness: There is no abdominal tenderness. There is no guarding or rebound.   Genitourinary:     Labia:         Right: No rash, tenderness, lesion or injury.         Left: No rash, tenderness, lesion or injury.       Urethra: No prolapse, urethral pain, urethral swelling or urethral lesion.      Vagina: No signs of injury. No vaginal discharge, erythema, tenderness or bleeding.      Cervix: No cervical motion tenderness, discharge or friability.      Uterus: Not deviated, not enlarged and not tender.       Adnexa:         Right: No mass, tenderness or fullness.          Left: No mass, tenderness or fullness.     Musculoskeletal:      Cervical back: Neck supple.   Lymphadenopathy:      Cervical: No cervical adenopathy.   Skin:     General: Skin is warm and dry.      Coloration: Skin is not pale.      Findings: No erythema or rash.   Neurological:      Mental Status: She is alert and oriented to person, place, and time.   Psychiatric:         Behavior: Behavior normal.         Thought Content: Thought content normal.         Judgment: Judgment normal.           Ultrasound done due to only being 10w6d. TVUS: Acosta IUP at 11w3d based on CRL of  4.65 cm. Consistent with dating from prior ultrasound. FHR: 154.

## 2025-03-25 ENCOUNTER — RESULTS FOLLOW-UP (OUTPATIENT)
Dept: OBGYN CLINIC | Facility: CLINIC | Age: 30
End: 2025-03-25

## 2025-03-25 LAB
C TRACH RRNA SPEC QL NAA+PROBE: NEGATIVE
N GONORRHOEA RRNA SPEC QL NAA+PROBE: NEGATIVE

## 2025-04-02 NOTE — PATIENT INSTRUCTIONS
Thank you for choosing us for your  care today.  If you have any questions about your ultrasound or care, please do not hesitate to contact us or your primary obstetrician.        Some general instructions for your pregnancy are:    Exercise: Aim for 150 minutes per week of regular exercise.  Walking is great!  Nutrition: Choose healthy sources of calcium, iron, and protein.  Avoid ultraprocessed foods and added sugar.  Learn about Preeclampsia: preeclampsia is a common, potentially serious high blood pressure complication in pregnancy.  A blood pressure of 140mmHg (systolic or top number) or 90mmHg (diastolic or bottom number) should be evaluated by your doctor.  Aspirin is sometimes prescribed in early pregnancy to prevent preeclampsia in women with risk factors - ask your obstetrician if you should be on this medication.  For more resources, visit:  [email or web address].[email or web address]  If you smoke, please try to quit completely but also try to reduce your smoking by as much as possible (as soon as possible).  Do not vape.  Please also avoid cannabis products.  Other warning signs to watch out for in pregnancy or postpartum: chest pain, obstructed breathing or shortness of breath, seizures, thoughts of hurting yourself or your baby, bleeding, a painful or swollen leg, fever, or headache (see AWHONN POST-BIRTH Warning Signs campaign).  If these happen call 911.  Itching is also not normal in pregnancy and if you experience this, especially over your hands and feet, potentially worse at night, notify your doctors.     You elected to have non-invasive prenatal screening (NIPS). This involves a blood test to check for the four most common genetic syndromes (Trisomy 21, Trisomy 13, Trisomy 18, and sex chromosome abnormalities). It also MAY report the biologic sex of the fetus if you opted to learn this information. You can call our office to verbally review results to avoid inadvertently  learning this information via [employer], if desired. Results will be visible in your sarvaMAIL portal 7-10 business days from when the test is drawn. Please follow all instructions regarding insurance cost/coverage provided to you today. Please contact our office with any concerns or questions. You will need spina bifida screening (called MSAFP) for the baby beginning at 15 weeks gestation, which will be ordered by your obstetrician's office. This test allows for earlier detection of spina bifida than is possible by ultrasound, and is advised in all pregnancies.

## 2025-04-04 ENCOUNTER — ROUTINE PRENATAL (OUTPATIENT)
Dept: PERINATAL CARE | Facility: OTHER | Age: 30
End: 2025-04-04
Payer: COMMERCIAL

## 2025-04-04 VITALS
BODY MASS INDEX: 23.3 KG/M2 | DIASTOLIC BLOOD PRESSURE: 64 MMHG | SYSTOLIC BLOOD PRESSURE: 110 MMHG | WEIGHT: 145 LBS | HEART RATE: 63 BPM | HEIGHT: 66 IN

## 2025-04-04 DIAGNOSIS — Z33.1 PREGNANT STATE, INCIDENTAL: ICD-10-CM

## 2025-04-04 DIAGNOSIS — Z36.0 ENCOUNTER FOR ANTENATAL SCREENING FOR CHROMOSOMAL ANOMALIES: ICD-10-CM

## 2025-04-04 DIAGNOSIS — Z3A.12 12 WEEKS GESTATION OF PREGNANCY: Primary | ICD-10-CM

## 2025-04-04 DIAGNOSIS — Z3A.01 7 WEEKS GESTATION OF PREGNANCY: ICD-10-CM

## 2025-04-04 DIAGNOSIS — Z36.82 ENCOUNTER FOR NUCHAL TRANSLUCENCY TESTING: ICD-10-CM

## 2025-04-04 DIAGNOSIS — O09.291 PRIOR PREGNANCY COMPLICATED BY IUGR, ANTEPARTUM, FIRST TRIMESTER: ICD-10-CM

## 2025-04-04 PROCEDURE — 76801 OB US < 14 WKS SINGLE FETUS: CPT | Performed by: OBSTETRICS & GYNECOLOGY

## 2025-04-04 PROCEDURE — 76813 OB US NUCHAL MEAS 1 GEST: CPT | Performed by: OBSTETRICS & GYNECOLOGY

## 2025-04-04 PROCEDURE — 99203 OFFICE O/P NEW LOW 30 MIN: CPT | Performed by: OBSTETRICS & GYNECOLOGY

## 2025-04-04 PROCEDURE — 36415 COLL VENOUS BLD VENIPUNCTURE: CPT | Performed by: OBSTETRICS & GYNECOLOGY

## 2025-04-04 NOTE — PROGRESS NOTES
Patient chose to have [facility] [id number] Non-Invasive Prenatal Screen [id number] PLUS w/ SCA, WITH fetal sex (per pt request).  Patient choose to be billed through insurance.     Patient given brochure and is aware [facility] will contact patient's insurance and coordinate coverage.  Provided [facility] contact information. General inquiries [phone], Cost estimates [phone] and [facility] Billing [phone]. Website [email or web address].     Blood collection tubes labeled with patient identifiers (name, medical record number, and date of birth).     Filled out [facility] order form. Patient chose to have blood drawn in our office at time of visit. NIPS was drawn from right arm with a butterfly needle by RUBY Cedeno MA.       If patient chose to have blood work drawn at a [facility] lab we requested patient notify MFM (via phone call or EventBoard message) when blood collected so office can follow up on results.       M[facility]etal Medicine will have results in approximately 5-7 business days and will call patient or notify via EventBoard.  Patient aware viewing lab result online will reveal fetal sex if ordered.    Patient verbalized understanding of all instructions and no questions at this time.

## 2025-04-04 NOTE — PROGRESS NOTES
"Eliseo presents today for genetic screening.  This is her fourth pregnancy.  She has had 3 term vaginal deliveries her first which was complicated by fetal growth restriction.  Her daughter was born at 38 weeks and weighed 5 pounds 3 ounces.  All of her children are currently healthy.  She has a history of wisdom teeth extraction.  She has no other significant contributory medical, surgical, substance use, or family history.  A review of systems is otherwise negative.    We discussed the options for genetic screening, including but not limited to first trimester screening, second trimester screening, combined first and second trimester screening, noninvasive prenatal screening (NIPS) for patients at high risk and diagnostic screening through the use of CVS and amniocentesis. We discussed the risks and benefits of each approach including the sensitivities and false positive rates as well as the difference between a screening test and a diagnostic test. At the conclusion of our discussion the patient elected noninvasive prenatal screening utilizing the MaterniT 21 plus test. The patient had this blood work drawn in the office.   The results should be available in approximately 7-10 days.    We discussed follow-up in detail and I recommend an anatomy ultrasound be scheduled for 20 weeks gestation.  We reviewed that we will likely recommend additional growth ultrasounds at around 28 and 34 weeks gestation for screening purposes given prior fetal growth restriction.    Thank you very much for allowing us to participate in the care of this very nice patient. Should you have any questions, please do not hesitate to contact me.    Portions of the record may have been created with voice recognition software. Occasional wrong word or \"sound a like\" substitutions may have occurred due to the inherent limitations of voice recognition software. Read the chart carefully and recognize, using context, where substitutions have " occurred.

## 2025-04-09 ENCOUNTER — RESULTS FOLLOW-UP (OUTPATIENT)
Facility: HOSPITAL | Age: 30
End: 2025-04-09

## 2025-04-09 LAB
CFDNA.FET/CFDNA.TOTAL SFR FETUS: NORMAL %
CFDNA.FET/CFDNA.TOTAL SFR FETUS: NORMAL %
CITATION REF LAB TEST: NORMAL
FET 13+18+21+X+Y ANEUP PLAS.CFDNA: NEGATIVE
FET CHR 21 TS PLAS.CFDNA QL: NEGATIVE
FET CHR 21 TS PLAS.CFDNA QL: NEGATIVE
FET MS X RISK WBC.DNA+CFDNA QL: NOT DETECTED
FET SEX PLAS.CFDNA DOSAGE CFDNA: NORMAL
FET TS 13 RISK PLAS.CFDNA QL: NEGATIVE
FET X + Y ANEUP RISK PLAS.CFDNA SEQ-IMP: NOT DETECTED
GA EST FROM CONCEPTION DATE: NORMAL D
GESTATIONAL AGE > 9:: YES
LAB DIRECTOR NAME PROVIDER: NORMAL
LABORATORY COMMENT REPORT: NORMAL
LIMITATIONS OF THE TEST: NORMAL
NEGATIVE PREDICTIVE VALUE: NORMAL
PERFORMANCE CHARACTERISTICS: NORMAL
POSITIVE PREDICTIVE VALUE: NORMAL
REF LAB TEST METHOD: NORMAL
SL AMB NOTE:: NORMAL
TEST PERFORMANCE INFO SPEC: NORMAL

## 2025-04-09 NOTE — RESULT ENCOUNTER NOTE
I have reviewed the results of the NIPS which are low risk.  Please call patient and notify her of these reassuring results if she has not viewed on MyChart. Please ensure she is notified of recommendation of MSAFP to be ordered and followed up through her primary Obstetrician's office.      Thank you, Luis Alfredo Conn MD

## 2025-04-11 LAB
EXTERNAL HEMATOCRIT: 35.3 %
EXTERNAL HEMOGLOBIN: 12.2 G/DL
EXTERNAL HEPATITIS B SURFACE ANTIGEN: NORMAL
EXTERNAL HIV-1/2 AB-AG: NORMAL
EXTERNAL PLATELET COUNT: 174 K/ÂΜL
EXTERNAL RH FACTOR: POSITIVE
EXTERNAL RUBELLA IGG QUANTITATION: NORMAL
EXTERNAL SYPHILIS TOTAL IGG/IGM SCREENING: NORMAL

## 2025-04-14 LAB
ABO GROUP BLD: ABNORMAL
APPEARANCE UR: CLEAR
BACTERIA UR CULT: ABNORMAL
BACTERIA UR CULT: ABNORMAL
BACTERIA URNS QL MICRO: ABNORMAL
BASOPHILS # BLD AUTO: 0 X10E3/UL (ref 0–0.2)
BASOPHILS NFR BLD AUTO: 0 %
BILIRUB UR QL STRIP: NEGATIVE
BLD GP AB SCN SERPL QL: NEGATIVE
C TRACH RRNA SPEC QL NAA+PROBE: NEGATIVE
CASTS URNS QL MICRO: ABNORMAL /LPF
COLOR UR: YELLOW
CRYSTALS URNS MICRO: ABNORMAL
EOSINOPHIL # BLD AUTO: 0.1 X10E3/UL (ref 0–0.4)
EOSINOPHIL NFR BLD AUTO: 1 %
EPI CELLS #/AREA URNS HPF: >10 /HPF (ref 0–10)
ERYTHROCYTE [DISTWIDTH] IN BLOOD BY AUTOMATED COUNT: 12.5 % (ref 11.7–15.4)
GLUCOSE UR QL: NEGATIVE
HBV SURFACE AG SERPL QL IA: NEGATIVE
HCT VFR BLD AUTO: 35.3 % (ref 34–46.6)
HCV AB S/CO SERPL IA: NON REACTIVE
HGB BLD-MCNC: 12.2 G/DL (ref 11.1–15.9)
HGB UR QL STRIP: NEGATIVE
HIV 1+2 AB+HIV1 P24 AG SERPL QL IA: NON REACTIVE
IMM GRANULOCYTES # BLD: 0 X10E3/UL (ref 0–0.1)
IMM GRANULOCYTES NFR BLD: 0 %
KETONES UR QL STRIP: NEGATIVE
LEUKOCYTE ESTERASE UR QL STRIP: NEGATIVE
LYMPHOCYTES # BLD AUTO: 1.2 X10E3/UL (ref 0.7–3.1)
LYMPHOCYTES NFR BLD AUTO: 14 %
MCH RBC QN AUTO: 34.2 PG (ref 26.6–33)
MCHC RBC AUTO-ENTMCNC: 34.6 G/DL (ref 31.5–35.7)
MCV RBC AUTO: 99 FL (ref 79–97)
MICRO URNS: ABNORMAL
MICRO URNS: ABNORMAL
MONOCYTES # BLD AUTO: 0.4 X10E3/UL (ref 0.1–0.9)
MONOCYTES NFR BLD AUTO: 5 %
N GONORRHOEA RRNA SPEC QL NAA+PROBE: NEGATIVE
NEUTROPHILS # BLD AUTO: 7.2 X10E3/UL (ref 1.4–7)
NEUTROPHILS NFR BLD AUTO: 80 %
NITRITE UR QL STRIP: NEGATIVE
OTHER ANTIBIOTIC SUSC ISLT: ABNORMAL
PH UR STRIP: 6.5 [PH] (ref 5–7.5)
PLATELET # BLD AUTO: 174 X10E3/UL (ref 150–450)
PROT UR QL STRIP: ABNORMAL
RBC # BLD AUTO: 3.57 X10E6/UL (ref 3.77–5.28)
RBC #/AREA URNS HPF: ABNORMAL /HPF (ref 0–2)
RH BLD: POSITIVE
RPR SER QL: NON REACTIVE
RUBV IGG SERPL IA-ACNC: 1.54 INDEX
SL AMB INTERPRETATION: NORMAL
SP GR UR: 1.03 (ref 1–1.03)
UNIDENT CRYS URNS QL MICRO: PRESENT
UROBILINOGEN UR STRIP-ACNC: 1 MG/DL (ref 0.2–1)
WBC # BLD AUTO: 8.9 X10E3/UL (ref 3.4–10.8)
WBC #/AREA URNS HPF: ABNORMAL /HPF (ref 0–5)

## 2025-04-15 ENCOUNTER — RESULTS FOLLOW-UP (OUTPATIENT)
Dept: OBGYN CLINIC | Facility: CLINIC | Age: 30
End: 2025-04-15

## 2025-04-15 DIAGNOSIS — Z36.89 ENCOUNTER FOR OTHER SPECIFIED ANTENATAL SCREENING: Primary | ICD-10-CM

## 2025-04-18 ENCOUNTER — ROUTINE PRENATAL (OUTPATIENT)
Dept: OBGYN CLINIC | Facility: CLINIC | Age: 30
End: 2025-04-18
Payer: COMMERCIAL

## 2025-04-18 VITALS
HEIGHT: 66 IN | SYSTOLIC BLOOD PRESSURE: 120 MMHG | BODY MASS INDEX: 23.63 KG/M2 | WEIGHT: 147 LBS | DIASTOLIC BLOOD PRESSURE: 58 MMHG

## 2025-04-18 DIAGNOSIS — Z3A.14 14 WEEKS GESTATION OF PREGNANCY: Primary | ICD-10-CM

## 2025-04-18 DIAGNOSIS — O09.291 PRIOR PREGNANCY COMPLICATED BY IUGR, ANTEPARTUM, FIRST TRIMESTER: ICD-10-CM

## 2025-04-18 DIAGNOSIS — Z36.89 ENCOUNTER FOR OTHER SPECIFIED ANTENATAL SCREENING: ICD-10-CM

## 2025-04-18 LAB
SL AMB  POCT GLUCOSE, UA: NORMAL
SL AMB POCT URINE PROTEIN: NORMAL

## 2025-04-18 PROCEDURE — PNV: Performed by: OBSTETRICS & GYNECOLOGY

## 2025-04-18 PROCEDURE — 81002 URINALYSIS NONAUTO W/O SCOPE: CPT | Performed by: OBSTETRICS & GYNECOLOGY

## 2025-04-18 NOTE — PROGRESS NOTES
"Routine Prenatal Visit  [facility] - [address], [address], [place] [address]    Assessment/Plan:  Eliseo is a 29 y.o. year old  at 14w6d who presents for routine prenatal visit.     Assessment & Plan  14 weeks gestation of pregnancy    Orders:    POCT urine dip    Prior pregnancy complicated by IUGR, antepartum, first trimester         Encounter for other specified  screening    Orders:    Alpha fetoprotein, maternal; Future      Next OB Visit 4 weeks.    Subjective:     CC: Prenatal care    Eliseo Noriega is a 29 y.o.  female who presents for routine prenatal care at 14w6d.  Pregnancy ROS: no leakage of fluid, pelvic pain, or vaginal bleeding.  Has not fetal movement.    The following portions of the patient's history were reviewed and updated as appropriate: allergies, current medications, past family history, past medical history, obstetric history, gynecologic history, past social history, past surgical history and problem list.      Objective:  /58 (BP Location: Right arm, Patient Position: Sitting, Cuff Size: Standard)   Ht 5' 6\" (1.676 m)   Wt 66.7 kg (147 lb)   LMP 2024 (Exact Date)   BMI 23.73 kg/m²   Pregravid Weight/BMI: 59 kg (130 lb) (BMI 20.99)  Current Weight: 66.7 kg (147 lb)   Total Weight Gain: 7.711 kg (17 lb)   Pre- Vitals      Flowsheet Row Most Recent Value   Prenatal Assessment    Fetal Heart Rate 150   Fundal Height (cm) 15 cm   Movement Absent   Prenatal Vitals    Blood Pressure 120/58   Weight - Scale 66.7 kg (147 lb)   Urine Albumin/Glucose    Dilation/Effacement/Station    Vaginal Drainage    Draining Fluid No   Edema              General: Well appearing, no distress  Abdomen: Soft, gravid, nontender  Extremities: Non tender.  "

## 2025-05-01 ENCOUNTER — TELEPHONE (OUTPATIENT)
Dept: OBGYN CLINIC | Facility: CLINIC | Age: 30
End: 2025-05-01

## 2025-05-01 NOTE — TELEPHONE ENCOUNTER
Second Trimester Calls:    Overall how are you doing? She states overall she is feeling great and the nausea and headache had gone away. No concerns at this time beside her tail bone hurting which I advised her to look into the donut pillow to help alleviate the pressure when she is sitting. If she still in discomfort, advised she can take Tylenol prn.      Compliant with routine OB care appointments? Yes    Have you completed your 1st trimester labs? Yes    If you had NIPS with MFM, do you have a order for MSAFP? Yes, not completed yet. Pt aware the time frame to complete the test.    Can be completed 15w-22w6d, ideally 16w-18w    Have you seen MFM and do you have your detailed US scheduled? 6/9/25    Pregnancy Education-have you had a chance to review the classes offered and registered? Pt declined the classes but will look into the tour of the hospital.

## 2025-05-07 LAB
2ND TRIMESTER 4 SCREEN SERPL-IMP: NORMAL
AFP ADJ MOM SERPL: 1.31
AFP INTERP AMN-IMP: NORMAL
AFP INTERP SERPL-IMP: NORMAL
AFP INTERP SERPL-IMP: NORMAL
AFP SERPL-MCNC: 52.2 NG/ML
AGE AT DELIVERY: 31 YR
GA METHOD: NORMAL
GA: 17.3 WEEKS
IDDM PATIENT QL: NO
MULTIPLE PREGNANCY: NO
NEURAL TUBE DEFECT RISK FETUS: 4664 %

## 2025-05-12 ENCOUNTER — ROUTINE PRENATAL (OUTPATIENT)
Dept: OBGYN CLINIC | Facility: CLINIC | Age: 30
End: 2025-05-12
Payer: COMMERCIAL

## 2025-05-12 VITALS
BODY MASS INDEX: 24.75 KG/M2 | HEIGHT: 66 IN | DIASTOLIC BLOOD PRESSURE: 60 MMHG | SYSTOLIC BLOOD PRESSURE: 112 MMHG | WEIGHT: 154 LBS

## 2025-05-12 DIAGNOSIS — Z3A.18 18 WEEKS GESTATION OF PREGNANCY: Primary | ICD-10-CM

## 2025-05-12 DIAGNOSIS — O09.291 PRIOR PREGNANCY COMPLICATED BY IUGR, ANTEPARTUM, FIRST TRIMESTER: ICD-10-CM

## 2025-05-12 LAB
SL AMB  POCT GLUCOSE, UA: NORMAL
SL AMB POCT URINE PROTEIN: NORMAL

## 2025-05-12 PROCEDURE — 81002 URINALYSIS NONAUTO W/O SCOPE: CPT | Performed by: OBSTETRICS & GYNECOLOGY

## 2025-05-12 PROCEDURE — PNV: Performed by: OBSTETRICS & GYNECOLOGY

## 2025-05-12 NOTE — PROGRESS NOTES
"Routine Prenatal Visit  [facility] - [address], [address], [place] [address]    Assessment/Plan:  lEiseo is a 29 y.o. year old  at 18w2d who presents for routine prenatal visit.     Assessment & Plan  18 weeks gestation of pregnancy    Orders:    POCT urine dip    Prior pregnancy complicated by IUGR, antepartum, first trimester           Next OB Visit 4 weeks.    Subjective:     CC: Prenatal care    Eliseo Noriega is a 29 y.o.  female who presents for routine prenatal care at 18w2d.  Pregnancy ROS: no leakage of fluid, pelvic pain, or vaginal bleeding.  Has not felt fetal movement since start of pregnancy    The following portions of the patient's history were reviewed and updated as appropriate: allergies, current medications, past family history, past medical history, obstetric history, gynecologic history, past social history, past surgical history and problem list.      Objective:  /60 (BP Location: Right arm, Patient Position: Sitting, Cuff Size: Standard)   Ht 5' 6\" (1.676 m)   Wt 69.9 kg (154 lb)   LMP 2024 (Exact Date)   BMI 24.86 kg/m²   Pregravid Weight/BMI: 59 kg (130 lb) (BMI 20.99)  Current Weight: 69.9 kg (154 lb)   Total Weight Gain: 10.9 kg (24 lb)   Pre-Jocelin Vitals      Flowsheet Row Most Recent Value   Prenatal Assessment    Fetal Heart Rate 150   Fundal Height (cm) 18 cm   Movement Present   Prenatal Vitals    Blood Pressure 112/60   Weight - Scale 69.9 kg (154 lb)   Urine Albumin/Glucose    Dilation/Effacement/Station    Vaginal Drainage    Draining Fluid No   Edema              General: Well appearing, no distress  Abdomen: Soft, gravid, nontender  Extremities: Non tender.  "

## 2025-06-09 ENCOUNTER — ROUTINE PRENATAL (OUTPATIENT)
Dept: OBGYN CLINIC | Facility: CLINIC | Age: 30
End: 2025-06-09
Payer: COMMERCIAL

## 2025-06-09 ENCOUNTER — ROUTINE PRENATAL (OUTPATIENT)
Dept: PERINATAL CARE | Facility: OTHER | Age: 30
End: 2025-06-09
Attending: OBSTETRICS & GYNECOLOGY
Payer: COMMERCIAL

## 2025-06-09 VITALS
DIASTOLIC BLOOD PRESSURE: 74 MMHG | WEIGHT: 162 LBS | SYSTOLIC BLOOD PRESSURE: 108 MMHG | BODY MASS INDEX: 26.03 KG/M2 | HEIGHT: 66 IN

## 2025-06-09 VITALS
WEIGHT: 162.6 LBS | BODY MASS INDEX: 26.13 KG/M2 | DIASTOLIC BLOOD PRESSURE: 56 MMHG | HEIGHT: 66 IN | SYSTOLIC BLOOD PRESSURE: 110 MMHG | HEART RATE: 95 BPM

## 2025-06-09 DIAGNOSIS — Z3A.22 22 WEEKS GESTATION OF PREGNANCY: ICD-10-CM

## 2025-06-09 DIAGNOSIS — O09.292 HISTORY OF INTRAUTERINE GROWTH RESTRICTION IN PRIOR PREGNANCY, CURRENTLY PREGNANT, SECOND TRIMESTER: ICD-10-CM

## 2025-06-09 DIAGNOSIS — O09.292 PRIOR PREGNANCY COMPLICATED BY IUGR, ANTEPARTUM, SECOND TRIMESTER: Primary | ICD-10-CM

## 2025-06-09 DIAGNOSIS — Z36.86 ENCOUNTER FOR ANTENATAL SCREENING FOR CERVICAL LENGTH: ICD-10-CM

## 2025-06-09 DIAGNOSIS — Z36.3 ENCOUNTER FOR ANTENATAL SCREENING FOR MALFORMATIONS: Primary | ICD-10-CM

## 2025-06-09 LAB
SL AMB  POCT GLUCOSE, UA: NORMAL
SL AMB POCT URINE PROTEIN: NORMAL

## 2025-06-09 PROCEDURE — 76817 TRANSVAGINAL US OBSTETRIC: CPT | Performed by: OBSTETRICS & GYNECOLOGY

## 2025-06-09 PROCEDURE — 76805 OB US >/= 14 WKS SNGL FETUS: CPT | Performed by: OBSTETRICS & GYNECOLOGY

## 2025-06-09 PROCEDURE — PNV: Performed by: OBSTETRICS & GYNECOLOGY

## 2025-06-09 PROCEDURE — 81002 URINALYSIS NONAUTO W/O SCOPE: CPT | Performed by: OBSTETRICS & GYNECOLOGY

## 2025-06-09 PROCEDURE — 99213 OFFICE O/P EST LOW 20 MIN: CPT | Performed by: OBSTETRICS & GYNECOLOGY

## 2025-06-09 NOTE — PROGRESS NOTES
"FOLLOW-UP: MATERNAL-FETAL MEDICINE  Name: Eliseo Noriega      : 1995      MRN: [id number]  Encounter Provider: Lester Youngblood MD  Encounter Date: 2025   Encounter department: [facility]  :  Assessment & Plan  22 weeks gestation of pregnancy         History of intrauterine growth restriction in prior pregnancy, currently pregnant, second trimester       Normal fetal growth by MFM ultrasound evaluation today  Repeat interval growth assessment at 32 weeks gestation  Encounter for  screening for cervical length       Normal cervical length by transvaginal sonography  Encounter for  screening for malformations       Normal detailed MFM fetal anatomy assessment today      History of Present Illness   HPI  Eliseo Noriega is a 29 y.o. female who presents for fetal ultrasound evaluation and MFM assessment.  She reports occasional lower abdominal discomfort, not associated with vaginal bleeding.  She reports fetal movement.  Noninvasive prenatal testing revealed negative results.  MSAFP screening revealed a normal value of 1.31 MoM.  Eliseo is a carrier for cystic fibrosis.  Her 's CF carrier screening negative.  A prenatal office note of May 12 was reviewed prior to the MFM encounter.  [facility]: Eliseo Noriega was seen today for anatomic survey and cervical length screening ultrasound. See ultrasound report under \"OB Procedures\" tab.         "

## 2025-06-09 NOTE — ASSESSMENT & PLAN NOTE
Normal fetal growth by MFM ultrasound evaluation today  Repeat interval growth assessment at 32 weeks gestation

## 2025-06-09 NOTE — PROGRESS NOTES
"Routine Prenatal Visit  [facility] [address], [address], [place] [address]    Assessment/Plan:  Eliseo is a 29 y.o. year old  at 22w2d who presents for routine prenatal visit.     1. Prior pregnancy complicated by IUGR, antepartum, second trimester  Assessment & Plan:  Anatomy normal today  2. 22 weeks gestation of pregnancy  -     POCT urine dip        Subjective:   Eliseo Noriega is a 29 y.o.  who presents for routine prenatal care at 22w2d.  Complaints today: Denies  LOF: -; VB: -; Contractions: -; FM: +    Objective:  /74   Ht 5' 6\" (1.676 m)   Wt 73.5 kg (162 lb)   LMP 2024 (Exact Date)   BMI 26.15 kg/m²     General: Well appearing, no distress  Respiratory: Unlabored breathing  Abdomen: Soft, gravid, nontender  Extremities: Warm and well perfused.  Non tender.    Pregravid Weight/BMI: 59 kg (130 lb) (BMI 20.99)  Current Weight: 73.5 kg (162 lb)   Total Weight Gain: 14.5 kg (32 lb)     Pre- Vitals      Flowsheet Row Most Recent Value   Prenatal Assessment    Fetal Heart Rate 140   Movement Present   Prenatal Vitals    Blood Pressure 108/74   Weight - Scale 73.5 kg (162 lb)   Urine Albumin/Glucose    Dilation/Effacement/Station    Vaginal Drainage    Edema              Teresa Wilkinson DO  2025 5:44 PM     "

## 2025-06-10 ENCOUNTER — TELEPHONE (OUTPATIENT)
Age: 30
End: 2025-06-10

## 2025-07-09 PROBLEM — Z36.3 ENCOUNTER FOR ANTENATAL SCREENING FOR MALFORMATIONS: Status: RESOLVED | Noted: 2025-06-09 | Resolved: 2025-07-09

## 2025-07-10 ENCOUNTER — ROUTINE PRENATAL (OUTPATIENT)
Dept: OBGYN CLINIC | Facility: CLINIC | Age: 30
End: 2025-07-10

## 2025-07-10 VITALS
HEIGHT: 66 IN | BODY MASS INDEX: 26.36 KG/M2 | WEIGHT: 164 LBS | SYSTOLIC BLOOD PRESSURE: 110 MMHG | DIASTOLIC BLOOD PRESSURE: 70 MMHG

## 2025-07-10 DIAGNOSIS — Z36.89 ENCOUNTER FOR OTHER SPECIFIED ANTENATAL SCREENING: ICD-10-CM

## 2025-07-10 DIAGNOSIS — O09.292 PRIOR PREGNANCY COMPLICATED BY IUGR, ANTEPARTUM, SECOND TRIMESTER: Primary | ICD-10-CM

## 2025-07-10 DIAGNOSIS — Z3A.26 26 WEEKS GESTATION OF PREGNANCY: ICD-10-CM

## 2025-07-10 PROCEDURE — PNV: Performed by: STUDENT IN AN ORGANIZED HEALTH CARE EDUCATION/TRAINING PROGRAM

## 2025-07-10 NOTE — ASSESSMENT & PLAN NOTE
- PTL/PPROM/Bleeding precautions given.   - MFM consult report from level II ultrasound reviewed.   - 28 week labs ordered, lab requisition provided to patient.   - Problem list updated, results console reviewed and updated with pertinent prenatal labs.  - PMH, PSH, medications reviewed and updated as needed.  - Return to office in 2wk(s) for routine prenatal care

## 2025-07-10 NOTE — ASSESSMENT & PLAN NOTE
- Discussed recommendation for growth US in 3rd trimester. Encouraged patient to call MFM to schedule.

## 2025-07-10 NOTE — PROGRESS NOTES
"Routine Prenatal Visit  [facility] - [place] [address], [address], [place] [address]  Assessment & Plan  Prior pregnancy complicated by IUGR, antepartum, second trimester  - Discussed recommendation for growth US in 3rd trimester. Encouraged patient to call MFM to schedule.        26 weeks gestation of pregnancy  - PTL/PPROM/Bleeding precautions given.   - MFM consult report from level II ultrasound reviewed.   - 28 week labs ordered, lab requisition provided to patient.   - Problem list updated, results console reviewed and updated with pertinent prenatal labs.  - PMH, PSH, medications reviewed and updated as needed.  - Return to office in 2wk(s) for routine prenatal care       Encounter for other specified  screening    Orders:  •  Glucose, 1H PG; Future  •  CBC; Future  •  RPR, Rfx Qn RPR/Confirm TP; Future    Subjective:   Eliseo Noriega is a 29 y.o.  who presents for routine prenatal care at 26w5d.  Complaints today: None  LOF: No; VB: No; Contractions: No; FM: Present    Objective:  /70 (BP Location: Left arm, Patient Position: Sitting, Cuff Size: Standard)   Ht 5' 6\" (1.676 m)   Wt 74.4 kg (164 lb)   LMP 2024 (Exact Date)   BMI 26.47 kg/m²     General: Well appearing, no distress  Respiratory: Unlabored breathing  Cardiovascular: Regular rate.  Abdomen: Soft, gravid, nontender  Extremities: Warm and well perfused.  Non tender.    Pregravid Weight/BMI: 59 kg (130 lb) (BMI 20.99)  Current Weight: 74.4 kg (164 lb)   Total Weight Gain: 15.4 kg (34 lb)     Pre-Jocelin Vitals    Flowsheet Row Most Recent Value   Prenatal Assessment    Fetal Heart Rate 135   Fundal Height (cm) 26 cm   Movement Present   Prenatal Vitals    Blood Pressure 110/70   Weight - Scale 74.4 kg (164 lb)   Urine Albumin/Glucose    Dilation/Effacement/Station    Vaginal Drainage    Draining Fluid No   Edema    LLE Edema None   RLE Edema None   Facial Edema None           Thang Hummel, " MD  7/10/2025 10:05 AM

## 2025-07-21 PROBLEM — Z3A.28 28 WEEKS GESTATION OF PREGNANCY: Status: ACTIVE | Noted: 2025-03-21

## 2025-07-22 ENCOUNTER — ROUTINE PRENATAL (OUTPATIENT)
Dept: OBGYN CLINIC | Facility: CLINIC | Age: 30
End: 2025-07-22

## 2025-07-22 VITALS
WEIGHT: 168.4 LBS | HEIGHT: 66 IN | SYSTOLIC BLOOD PRESSURE: 112 MMHG | DIASTOLIC BLOOD PRESSURE: 68 MMHG | BODY MASS INDEX: 27.06 KG/M2

## 2025-07-22 DIAGNOSIS — O09.293 PRIOR PREGNANCY COMPLICATED BY IUGR, ANTEPARTUM, THIRD TRIMESTER: Primary | ICD-10-CM

## 2025-07-22 DIAGNOSIS — Z3A.28 28 WEEKS GESTATION OF PREGNANCY: ICD-10-CM

## 2025-07-22 PROCEDURE — PNV: Performed by: OBSTETRICS & GYNECOLOGY

## 2025-07-22 NOTE — PROGRESS NOTES
"3rd Trimester Visit  Name: Eliseo Noriega      : 1995      MRN: [id number]  Encounter Provider: Adamaris Hurtado MD  Encounter Date: 2025   Encounter department: [facility] [place]    29 y.o.  at 28w3d presenting for routine OB visit at 28w3d.:  Assessment & Plan  Prior pregnancy complicated by IUGR, antepartum, third trimester  Has 32 week growth scheduled.         28 weeks gestation of pregnancy  - planning 28 week labs this week  - Declined Adacel vaccine today             Scheduled for ultrasound 32 weeks for h/o IUGR and f/u anatomy.  Reviewed premature labor precautions and fetal kick counts.  Advised to continue medications and return in 2 weeks.    History of Present Illness     She reports no issues.  Denies uterine contractions.  Denies vaginal bleeding or leaking of fluid.  Reports normal fetal movement .         Current Outpatient Medications   Medication Instructions    Prenat-Fe Carbonyl-FA-Omega 3 (ONE-A-DAY WOMENS PRENATAL 1 PO) Take by mouth     Objective   /68 (BP Location: Left arm, Patient Position: Sitting, Cuff Size: Standard)   Ht 5' 6\" (1.676 m)   Wt 76.4 kg (168 lb 6.4 oz)   LMP 2024 (Exact Date)   BMI 27.18 kg/m²      BP: Blood Pressure: 112/68  Wt: 76.4 kg (168 lb 6.4 oz); Body mass index is 27.18 kg/m².; TWG=17.4 kg (38 lb 6.4 oz)  Fetal Heart Rate: 150; Fundal Height (cm): 28 cm    Abdomen: ”soft”,”non tender”         Problems (from 25 to present)       Problem Noted Diagnosed Resolved    28 weeks gestation of pregnancy 3/21/2025 by Andreea Salomon PA-C  No    Overview Signed 2025  6:15 PM by Adamaris Hurtado MD   2025 Adacel declined         Cystic fibrosis carrier, antepartum 3/21/2025 by Andreea Salomon PA-C  No    Overview Signed 3/21/2025 12:05 PM by Andreea Salomon PA-C   Per patient partner negative. Will request records.          Prior pregnancy complicated by IUGR, antepartum, third " trimester 3/21/2025 by Andreea Salomon PA-C  No    Overview Signed 3/21/2025 12:24 PM by Andreea Salomon PA-C   3/2019, induced at 38 weeks secondary to suspected IUGR

## 2025-07-25 LAB
EXTERNAL HEMOGLOBIN: 12 G/DL
EXTERNAL PLATELET COUNT: 155 K/ÂΜL
EXTERNAL SYPHILIS TOTAL IGG/IGM SCREENING: NORMAL

## 2025-07-26 LAB
ERYTHROCYTE [DISTWIDTH] IN BLOOD BY AUTOMATED COUNT: 12.5 % (ref 11.7–15.4)
GLUCOSE 1H P 50 G GLC PO SERPL-MCNC: 94 MG/DL (ref 70–139)
HCT VFR BLD AUTO: 34.9 % (ref 34–46.6)
HGB BLD-MCNC: 12 G/DL (ref 11.1–15.9)
MCH RBC QN AUTO: 35.4 PG (ref 26.6–33)
MCHC RBC AUTO-ENTMCNC: 34.4 G/DL (ref 31.5–35.7)
MCV RBC AUTO: 103 FL (ref 79–97)
PLATELET # BLD AUTO: 155 X10E3/UL (ref 150–450)
RBC # BLD AUTO: 3.39 X10E6/UL (ref 3.77–5.28)
RPR SER QL: NON REACTIVE
WBC # BLD AUTO: 6.7 X10E3/UL (ref 3.4–10.8)

## 2025-08-06 ENCOUNTER — TELEPHONE (OUTPATIENT)
Dept: OBGYN CLINIC | Facility: CLINIC | Age: 30
End: 2025-08-06

## 2025-08-08 ENCOUNTER — ROUTINE PRENATAL (OUTPATIENT)
Dept: OBGYN CLINIC | Facility: CLINIC | Age: 30
End: 2025-08-08

## 2025-08-08 VITALS
WEIGHT: 170 LBS | BODY MASS INDEX: 27.32 KG/M2 | DIASTOLIC BLOOD PRESSURE: 64 MMHG | SYSTOLIC BLOOD PRESSURE: 110 MMHG | HEIGHT: 66 IN

## 2025-08-08 DIAGNOSIS — O09.899 CYSTIC FIBROSIS CARRIER, ANTEPARTUM: ICD-10-CM

## 2025-08-08 DIAGNOSIS — O09.293 PRIOR PREGNANCY COMPLICATED BY IUGR, ANTEPARTUM, THIRD TRIMESTER: ICD-10-CM

## 2025-08-08 DIAGNOSIS — Z3A.30 30 WEEKS GESTATION OF PREGNANCY: Primary | ICD-10-CM

## 2025-08-08 DIAGNOSIS — Z14.1 CYSTIC FIBROSIS CARRIER, ANTEPARTUM: ICD-10-CM

## 2025-08-08 DIAGNOSIS — L29.9 PRURITUS: ICD-10-CM

## 2025-08-08 PROCEDURE — PNV: Performed by: OBSTETRICS & GYNECOLOGY

## 2025-08-09 LAB
ALBUMIN SERPL-MCNC: 3.8 G/DL (ref 4–5)
ALP SERPL-CCNC: 109 IU/L (ref 44–121)
ALT SERPL-CCNC: 8 IU/L (ref 0–32)
AST SERPL-CCNC: 17 IU/L (ref 0–40)
BILIRUB SERPL-MCNC: 0.3 MG/DL (ref 0–1.2)
BUN SERPL-MCNC: 8 MG/DL (ref 6–20)
BUN/CREAT SERPL: 11 (ref 9–23)
CALCIUM SERPL-MCNC: 8.6 MG/DL (ref 8.7–10.2)
CHLORIDE SERPL-SCNC: 106 MMOL/L (ref 96–106)
CO2 SERPL-SCNC: 18 MMOL/L (ref 20–29)
CREAT SERPL-MCNC: 0.72 MG/DL (ref 0.57–1)
EGFR: 115 ML/MIN/1.73
GLOBULIN SER-MCNC: 2.3 G/DL (ref 1.5–4.5)
GLUCOSE SERPL-MCNC: 77 MG/DL (ref 70–99)
POTASSIUM SERPL-SCNC: 4 MMOL/L (ref 3.5–5.2)
PROT SERPL-MCNC: 6.1 G/DL (ref 6–8.5)
SODIUM SERPL-SCNC: 137 MMOL/L (ref 134–144)

## 2025-08-10 LAB — BILE AC SERPL-SCNC: 3.4 UMOL/L (ref 0–10)

## 2025-08-11 ENCOUNTER — TELEPHONE (OUTPATIENT)
Age: 30
End: 2025-08-11

## 2025-08-11 ENCOUNTER — TELEPHONE (OUTPATIENT)
Dept: OBGYN CLINIC | Facility: CLINIC | Age: 30
End: 2025-08-11

## 2025-08-18 ENCOUNTER — ANCILLARY PROCEDURE (OUTPATIENT)
Dept: PERINATAL CARE | Facility: OTHER | Age: 30
End: 2025-08-18
Attending: OBSTETRICS & GYNECOLOGY
Payer: COMMERCIAL

## 2025-08-18 ENCOUNTER — ULTRASOUND (OUTPATIENT)
Dept: PERINATAL CARE | Facility: OTHER | Age: 30
End: 2025-08-18
Payer: COMMERCIAL

## 2025-08-18 VITALS
WEIGHT: 173.2 LBS | DIASTOLIC BLOOD PRESSURE: 72 MMHG | SYSTOLIC BLOOD PRESSURE: 116 MMHG | HEIGHT: 66 IN | BODY MASS INDEX: 27.83 KG/M2 | HEART RATE: 88 BPM

## 2025-08-18 DIAGNOSIS — Z3A.32 32 WEEKS GESTATION OF PREGNANCY: ICD-10-CM

## 2025-08-18 DIAGNOSIS — Z3A.32 32 WEEKS GESTATION OF PREGNANCY: Primary | ICD-10-CM

## 2025-08-18 DIAGNOSIS — Z36.89 ENCOUNTER FOR ULTRASOUND TO CHECK FETAL GROWTH: ICD-10-CM

## 2025-08-18 DIAGNOSIS — O09.292 HISTORY OF INTRAUTERINE GROWTH RESTRICTION IN PRIOR PREGNANCY, CURRENTLY PREGNANT, SECOND TRIMESTER: ICD-10-CM

## 2025-08-18 PROBLEM — Z36.86 ENCOUNTER FOR ANTENATAL SCREENING FOR CERVICAL LENGTH: Status: RESOLVED | Noted: 2025-06-09 | Resolved: 2025-08-18

## 2025-08-18 PROCEDURE — 76816 OB US FOLLOW-UP PER FETUS: CPT | Performed by: OBSTETRICS & GYNECOLOGY

## 2025-08-21 ENCOUNTER — ROUTINE PRENATAL (OUTPATIENT)
Dept: OBGYN CLINIC | Facility: CLINIC | Age: 30
End: 2025-08-21

## 2025-08-21 VITALS
HEIGHT: 66 IN | DIASTOLIC BLOOD PRESSURE: 60 MMHG | SYSTOLIC BLOOD PRESSURE: 102 MMHG | WEIGHT: 173.6 LBS | BODY MASS INDEX: 27.9 KG/M2

## 2025-08-21 DIAGNOSIS — O09.293 PRIOR PREGNANCY COMPLICATED BY IUGR, ANTEPARTUM, THIRD TRIMESTER: Primary | ICD-10-CM

## 2025-08-21 DIAGNOSIS — Z3A.32 32 WEEKS GESTATION OF PREGNANCY: ICD-10-CM

## 2025-08-21 DIAGNOSIS — L29.9 PRURITUS OF PREGNANCY IN THIRD TRIMESTER: ICD-10-CM

## 2025-08-21 DIAGNOSIS — O99.713 PRURITUS OF PREGNANCY IN THIRD TRIMESTER: ICD-10-CM

## 2025-08-21 PROCEDURE — PNV: Performed by: STUDENT IN AN ORGANIZED HEALTH CARE EDUCATION/TRAINING PROGRAM

## 2025-08-23 LAB
ALBUMIN SERPL-MCNC: 3.7 G/DL (ref 4–5)
ALP SERPL-CCNC: 120 IU/L (ref 44–121)
ALT SERPL-CCNC: 8 IU/L (ref 0–32)
AST SERPL-CCNC: 16 IU/L (ref 0–40)
BILE AC SERPL-SCNC: 3.7 UMOL/L (ref 0–10)
BILIRUB DIRECT SERPL-MCNC: 0.1 MG/DL (ref 0–0.4)
BILIRUB SERPL-MCNC: 0.3 MG/DL (ref 0–1.2)
PROT SERPL-MCNC: 6.1 G/DL (ref 6–8.5)